# Patient Record
Sex: FEMALE | Race: WHITE | NOT HISPANIC OR LATINO | Employment: FULL TIME | URBAN - METROPOLITAN AREA
[De-identification: names, ages, dates, MRNs, and addresses within clinical notes are randomized per-mention and may not be internally consistent; named-entity substitution may affect disease eponyms.]

---

## 2019-07-15 ENCOUNTER — TELEPHONE (OUTPATIENT)
Dept: AUDIOLOGY | Facility: CLINIC | Age: 69
End: 2019-07-15

## 2019-07-15 NOTE — TELEPHONE ENCOUNTER
Returned call to patient.  She received a new headpiece that had a standard magnet in it.  She previously used a thin magnet.  She couldn't switch to her old thin magnet because it broke.  There is no skin breakdown.  I will mail out a new thin magnet today to patient's home.    Patient has not been seen since 2014.  She would like to set up annual check.  Appt will be made on 10/2/19 at 10:30 am.    The patient expressed understanding and agreement with this plan.      Johanna Gorman, CCC-A  Licensed Audiologist  [place] #[id number]

## 2019-07-15 NOTE — TELEPHONE ENCOUNTER
[facility] Call Center    Phone Message    May a detailed message be left on voicemail: yes    Reason for Call: Other: Patient has not been seen since 2014 and she stated she has a new head piece and she thinks the magnet is to tight for it she is getting irritation when she wears it. She stated she remembers Radha switching it out before to a less powerfull one. Please folow up with the patient she needs to know if she has to come in or if someone can help her over the phone. Thank you.     Action Taken: Message routed to:  [facility] audiology

## 2019-09-25 DIAGNOSIS — H90.3 SENSORINEURAL HEARING LOSS, BILATERAL: Primary | ICD-10-CM

## 2019-09-28 NOTE — PROGRESS NOTES
"AUDIOLOGY REPORT    BACKGROUND INFORMATION: Radha Treviño was seen in the Audiology Clinic on 10/2/19 for annual speech perception testing and reprogramming, ordered by Serenity Brown MD.  Dr. Jasbir Catherine implanted her with a left Advanced Bionics HiResolution 90K cochlear implant on 4/12/04 due to severe to profound sensorineural hearing loss bilaterally.  Her last Audiology visit was in 2014.        PATIENT REPORT:   1. Patient's equipment is intermittent with certain head movements.  She wonders if it is a faulty cable but doesn't have a spare.   2. Patient battles chronic left sinus issues.  This is managed by her PCP and she takes Singulair.  She wonders if she should see ENT.      METHOD: Speech perception testing is conducted at regular intervals to determine the degree of benefit the patient is obtaining from the cochlear implant. Tests are conducted using the cochlear implant without the benefit of lipreading. All tests are conducted in a sound-treated room. Perception of monosyllabic words and words in sentences are tested. Results usually show improvement over time. A decrease in performance indicates the need for re-programming of the prosthesis and/or replacement of components.     INTERVAL: 15 years     TEST RESULTS:  35 minutes were spent assessing the patient s auditory rehabilitation status.    Device(s) used for Testing:   Right ear: Not applicable  Left ear: Advanced Bionics Obdulia CI Q70 sound processor, Program 1 (Optima), 1 thin magnet without irritation  Microphone filters were changed before testing.  Normal listening check.  Patient wore clinic loaner cable during appointment and no intermittencies were noted.      Soundfield Aided Thresholds: Detection in borderline normal to moderate loss range.  Stable.  Patient will not tolerate more high frequency sound at this time as speech is \"pitchy\"    NOTE: Presentation level of 70 dB SPL was previously used but we changed to 60 dB A today (10/2/19) "  to be consistent with current testing recommendations.     CNC Words Test:  The patient repeats 25 single syllable words, auditory only. The words are presented at 70 dB SPL (louder than conversational level) delivered from a CD player.    Preoperative Performance: 4%    Left cochlear implant performance:  3 months at 70 dB SPL: 16%  6 months at 70 dB SPL: 24%  1 year at 70 dB SPL: 26%  10 years at 70 dB SPL: 24%  15 years (today) at 60 dB A: 48%    The Hearing in Noise Test (HINT):  The patient repeats 20 sentences, auditory only.   The sentences are presented in each condition at 70 dB SPL (louder than conversational level) delivered from a CD player.    Preoperative Performance: 14%    Left cochlear implant performance:  3 months at 70 dB SPL: 58%  6 months at 70 dB SPL: 44%  1 year at 70 dB SPL: 68%  10 years at 70 dB SPL: 74%  15 years (today) at 60 dB A: 85% in quiet, 43% with +10 dB signal to noise ratio (SNR)    Tympanograms: Right normal, Left shallow  Unaided hearing: No measurable hearing in either ear (profound sensorineural hearing loss bilaterally).  Only responses were vibrotactile.     FITTING SESSION: Dr. Serenity Brown ordered today's appointment. The patient came to the clinic for adjustment to the programs in the external speech processor and for assessment of the external components of the cochlear implant system. These components provide power and data to the internal device. Sound is only heard once the external portion is activated. Postoperative treatment, including device fitting and adjustment, audiologic assessments, and training are required at regular intervals. Testing can include electropsychophysical measures of threshold, comfort, and loudness balancing, which are completed to update the program.    Processor type: Obdulia CI Q70, also has Peg backup which she did not bring today  Headpiece type: Universal headpiece (UHP) for Obdulia CI Q70, HR90K for Peg  Magnet strength: UHP  thin    TEST RESULTS:   Electrode Impedances: Fairly stable with only slight fluctuations.  Recommended pulse width changed from 30.5 to 32.3.  Will monitor.    Dataloggin.6 hours of use per day  Neural Response Testing: Did not test  Facial Stimulation: Absent  Tinnitus: Absent  Balance Problems: Absent  Pain/Discomfort: Absent  Strategies Tried: Nelson, Optima S  Strategy Preference: [place] S    Programs in Obdulia CI Q70 (map #29):  1. Optima S, Clear Voice medium, T-mariely only  2. Optima S, Clear Voice medium, UltraZoom - processor microphone  3. Optima S, Clear Voice medium, Processor mariely     Number of Channels per Program: 15 (16 is off in all programs as it has been in the past)    COMMENTS: The pulse width was increased slightly (from 30.5 to 32.3) in her map based on today's impedances.  Volume was comfortable after the changes.  Programs 1-3 ([place]water S) were updated based on this change.  The old Tawanda S program was removed from slot 4 since she never uses it.      Patient is not currently interested in equipment upgrade as she would like to wait for new technology.     SUMMARY AND RECOMMENDATIONS: Radha has been using her left Advanced Bionics cochlear implant for over 15 years.  Audibility is stable compared to last testing from 5 years ago.  Speech perception scores were higher today but may have been affected by the change in presentation level (now using 60 dB A instead of 70 dB SPL).  Programming changes were made to slightly widen pulse width.  Patient will return to Audiology in 2 years for repeat speech perception testing and impedance monitoring.  She will return sooner as needed for programming.      Patient will place an order for a new cable with [employer].  Once received, she will return the loaner cable via mail.      Patient has no measurable hearing in either ear in the unaided condition.  Middle ear status is normal in the right ear.  The left tympanogram was shallow and patient  reports she continues to bowden left sinus issues.  I will have Dr. Brown review these symptoms and findings to determine if a clinic visit in ENT is advised.      The patient expressed understanding and agreement with this plan.      Johanna Gorman, CCC-A  Licensed Audiologist  [place] #[id number]        Enclosure: audiogram      Cc Serenity Brown MD

## 2019-10-02 ENCOUNTER — TELEPHONE (OUTPATIENT)
Dept: OTOLARYNGOLOGY | Facility: CLINIC | Age: 69
End: 2019-10-02

## 2019-10-02 ENCOUNTER — OFFICE VISIT (OUTPATIENT)
Dept: AUDIOLOGY | Facility: CLINIC | Age: 69
End: 2019-10-02
Payer: COMMERCIAL

## 2019-10-02 DIAGNOSIS — H90.3 SENSORY HEARING LOSS, BILATERAL: Primary | ICD-10-CM

## 2019-10-08 ENCOUNTER — TELEPHONE (OUTPATIENT)
Dept: OTOLARYNGOLOGY | Facility: CLINIC | Age: 69
End: 2019-10-08

## 2019-10-08 NOTE — TELEPHONE ENCOUNTER
Radha Prado to Dr. Brown in[employer] message       Hi Dr. Brown,   Can you take a look at this patient's tymps from today?  Dr. Catherine implanted her with left AB cochlear implant  in 2004.  She battles left sinus issues.  Managed by PCP and takes Singulair.  Left tymp was shallow today.  She says she feels fluid build in that ear at times.  Would you recommend she be seen in ENT?  And if so, by you or by sinus specialist?     Thanks,   DH

## 2019-10-08 NOTE — TELEPHONE ENCOUNTER
FUTURE VISIT INFORMATION      FUTURE VISIT INFORMATION:    Date: 10/30/2019    Time: 10:30 AM    [facility] [facility]   REFERRAL INFORMATION:    Referring provider:  Dr. Brown and Josias Pena    Referring providers clinic:  [facility]    Reason for visit/diagnosis  Sinus issues    RECORDS REQUESTED FROM:       Clinic name Comments Records Status Imaging Status   [facility] 10/2/19 Office visit with Josias Pena    [facility] 10/2/19, 10/5/19 Telephone Encounter  EPIC    [facility] 8/28/14 Audiogram with Josias Pena  7/17/14 Office visit with Josias Pena

## 2019-10-09 ENCOUNTER — DOCUMENTATION ONLY (OUTPATIENT)
Dept: CARE COORDINATION | Facility: CLINIC | Age: 69
End: 2019-10-09

## 2019-10-09 NOTE — TELEPHONE ENCOUNTER
FUTURE VISIT INFORMATION      FUTURE VISIT INFORMATION:    Date: 11/5/19    Time: 10AM    Location: [facility]  REFERRAL INFORMATION:    Referring provider:  Radha Ferrara    Referring providers clinic:  [facility]     Reason for visit/diagnosis : Left tymp was shallow. Feels fluid build in that ear at times-    RECORDS REQUESTED FROM:       Clinic name Comments Records Status Imaging Status   [facility] 10/2/19, 8/24/14 audiogram with Radha Ferrara    4/12/2004 Left Cochlear implant with Dr Catherine    [facility]    [employer]  Records dating back to 2002 for ENT and Audiology  [employer]

## 2019-10-23 NOTE — TELEPHONE ENCOUNTER
----- Message -----   From: Serenity Brown MD   Sent: 10/2/2019  12:29 PM CDT   To: Josias Almendarez, *   Subject: RE: CI patient with sinus issus and abnormal*     How about Dr. Cat for sinus and Dr. Nissen for her ear?  I don't know if they're here on the same day, but it'd be helpful.  Fernie.     Serenity   ----- Message -----   From: Radha Prado AuD   Sent: 10/2/2019  12:02 PM CDT   To: Serenity Brown MD, Josias Almendarez, *   Subject: CI patient with sinus issus and abnormal tym*     Hi Dr. Brown,   Can you take a look at this patient's tymps from today?  Dr. Catherine implanted her with left AB cochlear implant  in 2004.  She battles left sinus issues.  Managed by PCP and takes Singulair.  Left tymp was shallow today.  She says she feels fluid build in that ear at times.  Would you recommend she be seen in ENT?  And if so, by you or by sinus specialist?     Thanks,   RAH

## 2019-10-30 ENCOUNTER — PRE VISIT (OUTPATIENT)
Dept: OTOLARYNGOLOGY | Facility: CLINIC | Age: 69
End: 2019-10-30

## 2019-10-30 ENCOUNTER — OFFICE VISIT (OUTPATIENT)
Dept: OTOLARYNGOLOGY | Facility: CLINIC | Age: 69
End: 2019-10-30
Payer: COMMERCIAL

## 2019-10-30 VITALS — WEIGHT: 120 LBS | BODY MASS INDEX: 22.66 KG/M2 | HEIGHT: 61 IN

## 2019-10-30 DIAGNOSIS — J01.01 ACUTE RECURRENT MAXILLARY SINUSITIS: ICD-10-CM

## 2019-10-30 DIAGNOSIS — R09.81 NASAL CONGESTION: Primary | ICD-10-CM

## 2019-10-30 RX ORDER — CETIRIZINE HYDROCHLORIDE 10 MG/1
10 TABLET ORAL
COMMUNITY

## 2019-10-30 RX ORDER — PANTOPRAZOLE SODIUM 20 MG/1
20 TABLET, DELAYED RELEASE ORAL
COMMUNITY
Start: 2019-10-21

## 2019-10-30 RX ORDER — LOSARTAN POTASSIUM 50 MG/1
TABLET ORAL
Refills: 4 | COMMUNITY
Start: 2019-06-28

## 2019-10-30 RX ORDER — ACETAMINOPHEN 500 MG
500-1000 TABLET ORAL
COMMUNITY

## 2019-10-30 RX ORDER — BIOTIN 10 MG
TABLET ORAL
COMMUNITY

## 2019-10-30 RX ORDER — MONTELUKAST SODIUM 10 MG/1
TABLET ORAL
COMMUNITY
Start: 2018-06-29

## 2019-10-30 RX ORDER — ASCORBIC ACID 1000 MG
TABLET ORAL
COMMUNITY

## 2019-10-30 ASSESSMENT — PAIN SCALES - GENERAL: PAINLEVEL: NO PAIN (0)

## 2019-10-30 ASSESSMENT — MIFFLIN-ST. JEOR: SCORE: 1016.7

## 2019-10-30 NOTE — LETTER
10/30/2019     RE: Radha Negrete  [address] [id number]     Dear Colleague,    Thank you for referring your patient, Radha Negrete, to the [facility] E[facility] at [facility]. Please see a copy of my visit note below.             [facility]            New Patient Visit    Encounter date: October 30, 2019    Referring Provider:   Serenity Brown MD    Chief Complaint: chronic sinus congestion    History of Present Illness: Radha Negrete is a 69 year old woman with a history of left CI performed several years ago by Dr. Catherine. She was recently found to have somewhat of a flat tymp on follow up audio/CI eval, and related history of intermittent LT sided sinus congestion that waxes an wanes throughout the year. She has about 4-5 episodes per year of inc left sided facial pain/pressure, ear fullness and post-nasal discharge. Rarely does she every require antibiotics. She has been having this issue for several years. There is no prior history of sinonasal surgery. I am seeing her to evaluate for possible sinonasal inflammatory issue that may be contributing to abnormal tymp, ETD.       Review of systems: A 14-point review of systems has been conducted and was negative for any notable symptoms, except as dictated in the history of present illness.     PMH: no asthma, SNHL    PSH: CI; no sinonasal surgery    FH: non-contributory    Social History     Socioeconomic History     Marital status: Single     Spouse name: None     Number of children: None     Years of education: None     Highest education level: None   Occupational History     None   Social Needs     Financial resource strain: None     Food insecurity:     Worry: None     Inability: None     Transportation needs:     Medical: None     Non-medical: None   Tobacco Use     Smoking status: Never Smoker     Smokeless tobacco: Never Used   Substance and Sexual Activity     Alcohol use:  "None     Drug use: None     Sexual activity: None   Lifestyle     Physical activity:     Days per week: None     Minutes per session: None     Stress: None   Relationships     Social connections:     Talks on phone: None     Gets together: None     Attends Quaker service: None     Active member of club or organization: None     Attends meetings of clubs or organizations: None     Relationship status: None     Intimate partner violence:     Fear of current or ex partner: None     Emotionally abused: None     Physically abused: None     Forced sexual activity: None   Other Topics Concern     None   Social History Narrative     None        Physical Exam:  Vital signs: Ht 1.549 m (5' 1\")   Wt 54.4 kg (120 lb)   BMI 22.67 kg/m      General Appearance: No acute distress, appropriate demeanor, conversant  Eyes: moist conjunctivae; EOMI; pupils symmetric; visual acuity grossly intact; no proptosis  Head: normocephalic; overall symmetric appearance without deformity  Face: overall symmetric without deformity; HB I-VI  Ears: Normal appearance of external ear; external meatus normal in appearance; TMs intact without perforation bilaterally; there is moderate apparent thickening of the post-inf quadrant of TM without apparent SANDI  Nose: No external deformity; septum relatively midline; inferior turbinates without significant hypertrophy  Oral Cavity/oropharynx: Normal appearance of mucosa; tongue midline; no mass or lesions; oropharynx without obvious mucosal abnormality  Neck: no palpable lymphadenopathy; thyroid without palpable nodules  Lungs: symmetric chest rise; no wheezing  CV: Good distal perfusion; normal hear rate  Extremities: No deformity  Neurologic Exam: Cranial nerves II-XII are grossly intact; no focal deficit      Procedure Note  Procedure performed: Rigid nasal endoscopy  Indication: To evaluate for sinonasal pathology not visualized on routine anterior rhinoscopy  Anesthesia: 4% topical lidocaine with " "0.05% oxymetazoline  Description of procedure: A 30 degree, 3 mm rigid endoscope was inserted into bilateral nasal cavities and the nasal valves, nasal cavity, middle meatus, sphenoethmoid recess, and nasopharynx were thoroughly evaluated for evidence of obstruction, edema, purulence, polyps and/or mass/lesion.     Neelima-Dwaine Endoscopic Scoring System  Endoscopic observation Right Left   Polyps in middle meatus (0 = absent, 1 = restricted to middle meatus, 2 = Beyond middle meatus) 0 0   Discharge (0 = absent, 1 = thin and clear, 2 = thick, purulent) 0 0   Edema (0 = absent, 1 = mild-moderate, 2 = moderate-severe) 0 0   Crusting (0 = absent, 1 = mild-moderate, 2 = moderate-severe) 0 0   Scarring (0= absent, 1 = mild-moderate, 2 = moderate-severe) 0 0   Total 0 0     Findings  RT: MM and SER clear without evidence of chronic or acute inflammation  LT: MM and SER clear without evidence of chronic or acute inflammation    The patient tolerated the procedure well without complication.     Laboratory Review:  n/a    Imaging Review:  n/a    Pathology Review:  n/a    Assessment/Medical Decision Making:  History of CI with concern for sinonasal inflammation causing possible SANDI, ETD. I see no evidence of chronic or acute sinonasal inflammation on endoscopy today. Her symptoms are also quite mild, intermittent, and I think can be managed with topical therapy only at this point. Since her endoscopy is quite clear today and her symptoms are quite mild I am not recommending additional imaging at this time. She knows to call/return with worsening of symptoms or more frequent infections that don't resolve.     Plan:  1. Recommend BID flonase and prn saline irrigation when she develops \"flare\" in sinus symptoms.   2. RTC as needed    Anup Cat MD    [facility]  Rhinology  Endoscopic Skull Base Surgery  [facility]  Department of Otolaryngology - Head & Neck Surgery      "

## 2019-10-31 NOTE — PROGRESS NOTES
[facility]                   New Patient Visit      Encounter date: October 30, 2019    Referring Provider:   Serenity Brown MD    Chief Complaint: chronic sinus congestion    History of Present Illness: Radha Negrete is a 69 year old woman with a history of left CI performed several years ago by Dr. Catherine. She was recently found to have somewhat of a flat tymp on follow up audio/CI eval, and related history of intermittent LT sided sinus congestion that waxes an wanes throughout the year. She has about 4-5 episodes per year of inc left sided facial pain/pressure, ear fullness and post-nasal discharge. Rarely does she every require antibiotics. She has been having this issue for several years. There is no prior history of sinonasal surgery. I am seeing her to evaluate for possible sinonasal inflammatory issue that may be contributing to abnormal tymp, ETD.       Review of systems: A 14-point review of systems has been conducted and was negative for any notable symptoms, except as dictated in the history of present illness.     PMH: no asthma, SNHL    PSH: CI; no sinonasal surgery    FH: non-contributory    Social History     Socioeconomic History     Marital status: Single     Spouse name: None     Number of children: None     Years of education: None     Highest education level: None   Occupational History     None   Social Needs     Financial resource strain: None     Food insecurity:     Worry: None     Inability: None     Transportation needs:     Medical: None     Non-medical: None   Tobacco Use     Smoking status: Never Smoker     Smokeless tobacco: Never Used   Substance and Sexual Activity     Alcohol use: None     Drug use: None     Sexual activity: None   Lifestyle     Physical activity:     Days per week: None     Minutes per session: None     Stress: None   Relationships     Social connections:     Talks on phone: None     Gets together: None     Attends Tenriism  "service: None     Active member of club or organization: None     Attends meetings of clubs or organizations: None     Relationship status: None     Intimate partner violence:     Fear of current or ex partner: None     Emotionally abused: None     Physically abused: None     Forced sexual activity: None   Other Topics Concern     None   Social History Narrative     None        Physical Exam:  Vital signs: Ht 1.549 m (5' 1\")   Wt 54.4 kg (120 lb)   BMI 22.67 kg/m     General Appearance: No acute distress, appropriate demeanor, conversant  Eyes: moist conjunctivae; EOMI; pupils symmetric; visual acuity grossly intact; no proptosis  Head: normocephalic; overall symmetric appearance without deformity  Face: overall symmetric without deformity; HB I-VI  Ears: Normal appearance of external ear; external meatus normal in appearance; TMs intact without perforation bilaterally; there is moderate apparent thickening of the post-inf quadrant of TM without apparent SANDI  Nose: No external deformity; septum relatively midline; inferior turbinates without significant hypertrophy  Oral Cavity/oropharynx: Normal appearance of mucosa; tongue midline; no mass or lesions; oropharynx without obvious mucosal abnormality  Neck: no palpable lymphadenopathy; thyroid without palpable nodules  Lungs: symmetric chest rise; no wheezing  CV: Good distal perfusion; normal hear rate  Extremities: No deformity  Neurologic Exam: Cranial nerves II-XII are grossly intact; no focal deficit      Procedure Note  Procedure performed: Rigid nasal endoscopy  Indication: To evaluate for sinonasal pathology not visualized on routine anterior rhinoscopy  Anesthesia: 4% topical lidocaine with 0.05% oxymetazoline  Description of procedure: A 30 degree, 3 mm rigid endoscope was inserted into bilateral nasal cavities and the nasal valves, nasal cavity, middle meatus, sphenoethmoid recess, and nasopharynx were thoroughly evaluated for evidence of obstruction, " "edema, purulence, polyps and/or mass/lesion.     Joppa-Dwaine Endoscopic Scoring System  Endoscopic observation Right Left   Polyps in middle meatus (0 = absent, 1 = restricted to middle meatus, 2 = Beyond middle meatus) 0 0   Discharge (0 = absent, 1 = thin and clear, 2 = thick, purulent) 0 0   Edema (0 = absent, 1 = mild-moderate, 2 = moderate-severe) 0 0   Crusting (0 = absent, 1 = mild-moderate, 2 = moderate-severe) 0 0   Scarring (0= absent, 1 = mild-moderate, 2 = moderate-severe) 0 0   Total 0 0     Findings  RT: MM and SER clear without evidence of chronic or acute inflammation  LT: MM and SER clear without evidence of chronic or acute inflammation    The patient tolerated the procedure well without complication.     Laboratory Review:  n/a    Imaging Review:  n/a    Pathology Review:  n/a    Assessment/Medical Decision Making:  History of CI with concern for sinonasal inflammation causing possible SANDI, ETD. I see no evidence of chronic or acute sinonasal inflammation on endoscopy today. Her symptoms are also quite mild, intermittent, and I think can be managed with topical therapy only at this point. Since her endoscopy is quite clear today and her symptoms are quite mild I am not recommending additional imaging at this time. She knows to call/return with worsening of symptoms or more frequent infections that don't resolve.       Plan:  1. Recommend BID flonase and prn saline irrigation when she develops \"flare\" in sinus symptoms.   2. RTC as needed    Anup Cat MD    [facility]  Rhinology  Endoscopic Skull Base Surgery  [facility]  Department of Otolaryngology - Head & Neck Surgery      "

## 2019-11-05 ENCOUNTER — OFFICE VISIT (OUTPATIENT)
Dept: OTOLARYNGOLOGY | Facility: CLINIC | Age: 69
End: 2019-11-05
Payer: COMMERCIAL

## 2019-11-05 ENCOUNTER — HEALTH MAINTENANCE LETTER (OUTPATIENT)
Age: 69
End: 2019-11-05

## 2019-11-05 ENCOUNTER — PRE VISIT (OUTPATIENT)
Dept: OTOLARYNGOLOGY | Facility: CLINIC | Age: 69
End: 2019-11-05

## 2019-11-05 VITALS
BODY MASS INDEX: 24.19 KG/M2 | DIASTOLIC BLOOD PRESSURE: 76 MMHG | HEART RATE: 73 BPM | RESPIRATION RATE: 18 BRPM | SYSTOLIC BLOOD PRESSURE: 128 MMHG | TEMPERATURE: 97.7 F | HEIGHT: 59 IN | WEIGHT: 120 LBS

## 2019-11-05 DIAGNOSIS — H74.02 TYMPANOSCLEROSIS INVOLVING TYMPANIC MEMBRANE ONLY, LEFT: Primary | ICD-10-CM

## 2019-11-05 ASSESSMENT — PAIN SCALES - GENERAL: PAINLEVEL: NO PAIN (0)

## 2019-11-05 ASSESSMENT — MIFFLIN-ST. JEOR: SCORE: 985.82

## 2019-11-05 NOTE — PROGRESS NOTES
Dear Jenny Hebert:    I had the pleasure of meeting Radha Negrete in consultation today at the [facility] at your request.    CHIEF COMPLAINT: Ears    HISTORY OF PRESENT ILLNESS: Patient is a 69-year-old in today for assessment of her left ear.  She had a cochlear implant in 2004 and does quite well with that.  At her recent audiologic checkup on the cochlear implant, some concerns expressed with the left tympanic membrane.  She comes in for assessment of that.  She has had a history of sinus issues but treated by her local physician and doing well with that, he was just treated with decongestants and anti-allergy pills.  She denies any pain or drainage as far as the ears.  She does have bilateral tinnitus, left slightly more than right.  She denies any dizziness.  There is no dysphasia, hoarseness, facial paresthesias.    ALLERGIES:    Allergies   Allergen Reactions     Seasonal Allergies      Sulfamethoxazole-Trimethoprim Itching     Sulfa Drugs Hives and Rash       HABITS: Social History    Substance and Sexual Activity      Alcohol use: Yes        Comment: Occasional wine     History   Smoking Status     Never Smoker   Smokeless Tobacco     Former User     Comment: Quit 37 years ago         PAST MEDICAL HISTORY: Please see today's intake form (for the remainder of the PMH) which I reviewed and signed.  Past Medical History:   Diagnosis Date     Allergic rhinitis      Autoimmune disease (H)      Chronic sinusitis      Gastroesophageal reflux disease      Hearing problem      Sensorineural hearing loss      Tinnitus        FAMILY HISTORY/SOCIAL HISTORY: History reviewed. No pertinent family history.   Social History     Socioeconomic History     Marital status: Single     Spouse name: Not on file     Number of children: Not on file     Years of education: Not on file     Highest education level: Not on file   Occupational History     Not on file   Social Needs      Financial resource strain: Not on file     Food insecurity:     Worry: Not on file     Inability: Not on file     Transportation needs:     Medical: Not on file     Non-medical: Not on file   Tobacco Use     Smoking status: Never Smoker     Smokeless tobacco: Former User     Tobacco comment: Quit 37 years ago   Substance and Sexual Activity     Alcohol use: Yes     Comment: Occasional wine     Drug use: Never     Sexual activity: Never   Lifestyle     Physical activity:     Days per week: Not on file     Minutes per session: Not on file     Stress: Not on file   Relationships     Social connections:     Talks on phone: Not on file     Gets together: Not on file     Attends Congregational service: Not on file     Active member of club or organization: Not on file     Attends meetings of clubs or organizations: Not on file     Relationship status: Not on file     Intimate partner violence:     Fear of current or ex partner: Not on file     Emotionally abused: Not on file     Physically abused: Not on file     Forced sexual activity: Not on file   Other Topics Concern     Not on file   Social History Narrative     Not on file       REVIEW OF SYSTEMS: Patient Supplied Answers to Review of Systems   ENT ROS 10/29/2019   Ears, Nose, Throat Ringing/noise in ears, Nasal congestion or drainage   Gastrointestinal/Genitourinary Heartburn/indigestion   Musculoskeletal Swollen joints, Neck pain   Allergy/Immunology Allergies or hay fever       The remainder of the 10 point ROS is negative    PHYSICIAL EXAMINATION:  Constitutional: The patient was well-groomed and in no acute distress.   Skin: Warm and pink.  Psychiatric: The patient's affect was calm, cooperative, and appropriate.   Respiratory: Breathing comfortably without stridor or exertion of accessory muscles.  Eyes: Pupils were equal and reactive. Extraocular movement intact.   Head: Normocephalic and atraumatic. No lesions or scars.  Ears: Both ears examined on the  microscope.  Under high-power magnification the right side was cleaned with curettes and shows a normal tympanic membrane and middle ear.  The left ear was cleaned and examined using microscope and curette, similar techniques.  Again TM looks intact with air-filled middle ear space.  Posterior inferior she has sclerotic plaque on the TM, it looks totally benign and stable.  Nose: Sinuses were nontender. Anterior rhinoscopy revealed midline septum and absence of purulence or polyps.  Oral Cavity: Normal tongue, floor of mouth, buccal mucosa, and palate. No lesions or masses on inspection or palpation. No abnormal lymph tissue in the oropharynx.   Neck: The parotid is soft without masses. Supple with normal laryngeal and tracheal landmarks.   Lymphatic: There is no palpable lymphadenopathy or other masses in the neck.   Neurologic: Alert and oriented x 3. Cranial nerves III-XI within normal limits. Voice quality normal.  Cerebellar Function Tests:  Grossly normal        IMPRESSION AND PLAN:   1. Tympanosclerosis left TM: Stable appearance, no further treatment needed, monitor.  2. Excessive cerumen: Cleaned today, no further treatment needed, monitor.  3. Severe sensorineural hearing loss: Continue to maximize hearing with cochlear implant, no further treatment needed, monitor.    Thank you very much for the opportunity to participate in the care of your patient.    Rick L Nissen MD

## 2019-11-05 NOTE — PATIENT INSTRUCTIONS
1.  You were seen in the ENT Clinic today by Dr. Nissen.  If you have any questions or concerns after your appointment, please call [phone]. Press option #1 for scheduling related needs. Press option #3 for Nurse advice.    2.  Plan is to return to clinic as needed.      Terri Osuna LPN  [facility]  [phone]

## 2019-11-05 NOTE — NURSING NOTE
"Chief Complaint   Patient presents with     Consult     hearing loss, left ear      Blood pressure 128/76, pulse 73, temperature 97.7  F (36.5  C), resp. rate 18, height 1.5 m (4' 11.06\"), weight 54.4 kg (120 lb).    Juwan Blackman LPN    "

## 2019-11-05 NOTE — LETTER
11/5/2019       RE: Radha Negrete  [address] [address]     Dear Colleague,    Thank you for referring your patient, Radha Negrete, to the [facility] EAR NO[facility] AND THROAT at [facility]. Please see a copy of my visit note below.    Dear Jenny Hebert:    I had the pleasure of meeting Radha Negrete in consultation today at the [facility] at your request.    CHIEF COMPLAINT: Ears    HISTORY OF PRESENT ILLNESS: Patient is a 69-year-old in today for assessment of her left ear.  She had a cochlear implant in 2004 and does quite well with that.  At her recent audiologic checkup on the cochlear implant, some concerns expressed with the left tympanic membrane.  She comes in for assessment of that.  She has had a history of sinus issues but treated by her local physician and doing well with that, he was just treated with decongestants and anti-allergy pills.  She denies any pain or drainage as far as the ears.  She does have bilateral tinnitus, left slightly more than right.  She denies any dizziness.  There is no dysphasia, hoarseness, facial paresthesias.    ALLERGIES:    Allergies   Allergen Reactions     Seasonal Allergies      Sulfamethoxazole-Trimethoprim Itching     Sulfa Drugs Hives and Rash       HABITS: Social History    Substance and Sexual Activity      Alcohol use: Yes        Comment: Occasional wine     History   Smoking Status     Never Smoker   Smokeless Tobacco     Former User     Comment: Quit 37 years ago         PAST MEDICAL HISTORY: Please see today's intake form (for the remainder of the PMH) which I reviewed and signed.  Past Medical History:   Diagnosis Date     Allergic rhinitis      Autoimmune disease (H)      Chronic sinusitis      Gastroesophageal reflux disease      Hearing problem      Sensorineural hearing loss      Tinnitus        FAMILY HISTORY/SOCIAL HISTORY: History reviewed. No  pertinent family history.   Social History     Socioeconomic History     Marital status: Single     Spouse name: Not on file     Number of children: Not on file     Years of education: Not on file     Highest education level: Not on file   Occupational History     Not on file   Social Needs     Financial resource strain: Not on file     Food insecurity:     Worry: Not on file     Inability: Not on file     Transportation needs:     Medical: Not on file     Non-medical: Not on file   Tobacco Use     Smoking status: Never Smoker     Smokeless tobacco: Former User     Tobacco comment: Quit 37 years ago   Substance and Sexual Activity     Alcohol use: Yes     Comment: Occasional wine     Drug use: Never     Sexual activity: Never   Lifestyle     Physical activity:     Days per week: Not on file     Minutes per session: Not on file     Stress: Not on file   Relationships     Social connections:     Talks on phone: Not on file     Gets together: Not on file     Attends [employer] service: Not on file     Active member of club or organization: Not on file     Attends meetings of clubs or organizations: Not on file     Relationship status: Not on file     Intimate partner violence:     Fear of current or ex partner: Not on file     Emotionally abused: Not on file     Physically abused: Not on file     Forced sexual activity: Not on file   Other Topics Concern     Not on file   Social History Narrative     Not on file       REVIEW OF SYSTEMS: Patient Supplied Answers to Review of Systems   ENT ROS 10/29/2019   Ears, Nose, Throat Ringing/noise in ears, Nasal congestion or drainage   Gastrointestinal/Genitourinary Heartburn/indigestion   Musculoskeletal Swollen joints, Neck pain   Allergy/Immunology Allergies or hay fever       The remainder of the 10 point ROS is negative    PHYSICIAL EXAMINATION:  Constitutional: The patient was well-groomed and in no acute distress.   Skin: Warm and pink.  Psychiatric: The patient's affect  was calm, cooperative, and appropriate.   Respiratory: Breathing comfortably without stridor or exertion of accessory muscles.  Eyes: Pupils were equal and reactive. Extraocular movement intact.   Head: Normocephalic and atraumatic. No lesions or scars.  Ears: Both ears examined on the microscope.  Under high-power magnification the right side was cleaned with curettes and shows a normal tympanic membrane and middle ear.  The left ear was cleaned and examined using microscope and curette, similar techniques.  Again TM looks intact with air-filled middle ear space.  Posterior inferior she has sclerotic plaque on the TM, it looks totally benign and stable.  Nose: Sinuses were nontender. Anterior rhinoscopy revealed midline septum and absence of purulence or polyps.  Oral Cavity: Normal tongue, floor of mouth, buccal mucosa, and palate. No lesions or masses on inspection or palpation. No abnormal lymph tissue in the oropharynx.   Neck: The parotid is soft without masses. Supple with normal laryngeal and tracheal landmarks.   Lymphatic: There is no palpable lymphadenopathy or other masses in the neck.   Neurologic: Alert and oriented x 3. Cranial nerves III-XI within normal limits. Voice quality normal.  Cerebellar Function Tests:  Grossly normal        IMPRESSION AND PLAN:   1. Tympanosclerosis left TM: Stable appearance, no further treatment needed, monitor.  2. Excessive cerumen: Cleaned today, no further treatment needed, monitor.  3. Severe sensorineural hearing loss: Continue to maximize hearing with cochlear implant, no further treatment needed, monitor.    Thank you very much for the opportunity to participate in the care of your patient.    Rick L Nissen MD

## 2019-11-14 ENCOUNTER — TELEPHONE (OUTPATIENT)
Dept: AUDIOLOGY | Facility: CLINIC | Age: 69
End: 2019-11-14

## 2019-11-14 NOTE — TELEPHONE ENCOUNTER
Patient returned clinic loaner cable to the clinic via mail.      Johanna Gorman, CCC-A, [facility]  Licensed Audiologist  [place] #[id number]

## 2020-02-16 ENCOUNTER — HEALTH MAINTENANCE LETTER (OUTPATIENT)
Age: 70
End: 2020-02-16

## 2020-11-22 ENCOUNTER — HEALTH MAINTENANCE LETTER (OUTPATIENT)
Age: 70
End: 2020-11-22

## 2021-04-04 ENCOUNTER — HEALTH MAINTENANCE LETTER (OUTPATIENT)
Age: 71
End: 2021-04-04

## 2021-09-19 ENCOUNTER — HEALTH MAINTENANCE LETTER (OUTPATIENT)
Age: 71
End: 2021-09-19

## 2021-11-14 ENCOUNTER — HEALTH MAINTENANCE LETTER (OUTPATIENT)
Age: 71
End: 2021-11-14

## 2022-02-07 ENCOUNTER — TELEPHONE (OUTPATIENT)
Dept: AUDIOLOGY | Facility: CLINIC | Age: 72
End: 2022-02-07
Payer: COMMERCIAL

## 2022-02-07 NOTE — TELEPHONE ENCOUNTER
Letter of medical necessity for Obdulia CI M90 upgrade was routed to Dr. Brown for signature.  Once signed, it will electronically route to [employer], who will assist with insurance authorization.      Johanna Gorman, CCC-A, [facility]  Licensed Audiologist  [place] #[id number]

## 2022-03-07 DIAGNOSIS — H90.5 SNHL (SENSORINEURAL HEARING LOSS): Primary | ICD-10-CM

## 2022-05-01 ENCOUNTER — HEALTH MAINTENANCE LETTER (OUTPATIENT)
Age: 72
End: 2022-05-01

## 2022-05-02 NOTE — PROGRESS NOTES
AUDIOLOGY REPORT    BACKGROUND INFORMATION: Radha Treviño was seen in the Audiology Clinic at [facility] on 5/11/22 for cochlear implant programming and upgrade to the Obdulia CI M90 processor.  She was accompanied today by her sister, Germania.  Today's visit was odered by Serenity Brown MD.  Dr. Jasbir Catherine implanted her with a left Advanced Bionics HiResolution 90K cochlear implant on 4/12/04 due to severe to profound sensorineural hearing loss bilaterally.          PATIENT REPORT:   Patient arrives with Obdulia CI M90 upgrade kit.    FITTING SESSION: Dr. Serenity Brown ordered today's appointment. The patient came to the clinic for adjustment to the programs in the external speech processor and for assessment of the external components of the cochlear implant system. These components provide power and data to the internal device. Sound is only heard once the external portion is activated. Postoperative treatment, including device fitting and adjustment, audiologic assessments, and training are required at regular intervals. Testing can include electropsychophysical measures of threshold, comfort, and loudness balancing, which are completed to update the program.    Processor type: Obdulia CI M90 fit today; Backups: Obdulia CI Q70 and Peg   Headpiece type: Slim HP for M90, Universal headpiece (UHP) for Obdulia CI Q70, HR90K for Peg  Magnet strength: 1 in slim HP, 1 thin in UHP  *Patient is aware that she can add moleskin to the slim HP if needed.     TEST RESULTS:   Electrode Impedances: Fairly stable with slight fluctuations.  Will monitor.    Datalogging: Did not test since today is fitting   Neural Response Testing: Did not test  Facial Stimulation: Absent  Tinnitus: Absent  Balance Problems: Absent  Pain/Discomfort: Absent  Strategies Tried: HiResS, Optima S  Strategy Preference: [place] S    Programs in Obdulia CI M90:  Start-up program: AutoSense  Manual programs:   1.  Calm  2. Speech in noise  3. Off the ear    Programs in Obdulia CI Q70 (map #29): Did not change today   1. [place] S, Clear Voice medium, T-mariely only  2. Optima S, Clear Voice medium, UltraZoom - processor microphone  3. Optima S, Clear Voice medium, Processor mariely     Number of Channels per Program: 15 (16 is off in all programs as it has been in the past)    COMMENTS: The preferred Q70 program was converted from SoundWave to Target CI for use in the M90.  Upon going live in the M90 processor, no changes to most comfortable (M) levels were needed.  Patient was given manual programs in case she doesn't like AutoSense.  She was also given an off the ear program for use with the waterproof rechargeable battery pack.      All of the equipment in the M90 processor kit was reviewed. NOTE: TV Connector and waterproof rechargeable battery pack are backordered.  The processor was paired to the patient's cell phone and the AB remote chandra.  The patient was given time to practice use of the device and placement/removal of processor.  The patient's questions were answered.  Warranties were reviewed.  Patient has had her equipment a few months and postponed the fitting several times due to weather.  She will contact [employer] to see if they can change the warranty start date to today.      SUMMARY AND RECOMMENDATIONS: Radha upgraded to the Obdulia CI M90 processor today.  She will return in 1 month for follow up, magnet recheck, and speech perception testing.  If she has her waterproof rechargeable battery pack, she will bring it that day if she needs support.  The patient expressed understanding and agreement with this plan.    Johanna Gorman, CCC-A, [facility]  Licensed Audiologist  [place] #[id number]

## 2022-05-11 ENCOUNTER — OFFICE VISIT (OUTPATIENT)
Dept: AUDIOLOGY | Facility: CLINIC | Age: 72
End: 2022-05-11
Payer: COMMERCIAL

## 2022-05-11 DIAGNOSIS — H90.3 SENSORY HEARING LOSS, BILATERAL: Primary | ICD-10-CM

## 2022-05-12 ENCOUNTER — TELEPHONE (OUTPATIENT)
Dept: AUDIOLOGY | Facility: CLINIC | Age: 72
End: 2022-05-12
Payer: COMMERCIAL

## 2022-05-12 NOTE — TELEPHONE ENCOUNTER
Email received from patient. She contact [employer] to ask them to change her M90 warranty start date to the fitting date of 5/11/22.  They need confirmation from the clinic.  I sent email to [employer] customer service confirming fitting date was 5/11/22.      Johanna Gorman, CCC-A, [facility]  Licensed Audiologist  [place] #[id number]

## 2022-06-02 NOTE — PROGRESS NOTES
AUDIOLOGY REPORT    BACKGROUND INFORMATION: Radha Treviño was seen in the Audiology Clinic at [facility] on 6/23/22 for cochlear implant programming and testing.  Today's visit was odered by Serenity Brown MD.  Dr. Jasbir Catherine implanted her with a left Advanced Bionics HiResolution 90K cochlear implant on 4/12/04 due to severe to profound sensorineural hearing loss bilaterally.        Dr. Serenity Brown ordered today's visit.     PATIENT REPORT:   -Patient upgraded to the Obdulia CI M90 processor last month. She typically wears AutoSense with volume increased one step.  Therefore, she would like the starting volume increased.      -Patient reports that she has discussed some chandra glitches with Brenna Akbar at [employer].  She also noted one instance of no Bluetooth connection.  Pairing looks correct today.    -TV connector is still backordered.   -Discussed adjusting phone volume rather than CI volume when on the phone.      FITTING SESSION: Dr. Serenity Brown ordered today's appointment. The patient came to the clinic for adjustment to the programs in the external speech processor and for assessment of the external components of the cochlear implant system. These components provide power and data to the internal device. Sound is only heard once the external portion is activated. Postoperative treatment, including device fitting and adjustment, audiologic assessments, and training are required at regular intervals. Testing can include electropsychophysical measures of threshold, comfort, and loudness balancing, which are completed to update the program.    Processor type: Obdulia CI M90; Backups: Obdulia CI Q70 and Peg   Headpiece type: Slim HP for M90, Universal headpiece (UHP) for Obdulia CI Q70, HR90K for Peg  Magnet strength: 1 in slim HP, 1 thin in UHP  *No irritation or discomfort.  Patient is aware that she can add moleskin to the slim HP if needed.  She will monitor.       TEST RESULTS:   Electrode Impedances: Normal and stable     Dataloggin.6 hours of use per day  Neural Response Testing: Did not test  Facial Stimulation: Absent  Tinnitus: Absent  Balance Problems: Absent  Pain/Discomfort: Absent  Strategies Tried: HiResS, Optima S  Strategy Preference: [place] S    Programs in Obdulia CI M90:  Start-up program: AutoSense  Manual programs:   1. Calm  2. Speech in noise  3. Off the ear    Programs in Obdulia CI Q70 (map #29): Did not change today   1. [place] S, Clear Voice medium, T-mariely only  2. Optima S, Clear Voice medium, UltraZoom - processor microphone  3. Optima S, Clear Voice medium, Processor mariely     Number of Channels per Program: 15 (16 is off in all programs as it has been in the past)    COMMENTS: Patient requested that the starting volume be increased.  Most comfortable (M) levels were globally increased until comfort.  Initially patient wanted M levels increased 2 units, but then after testing, she requested another 2 unit increase.  Therefore, final M levels were 4 units higher than arrival settings.      METHOD: Speech perception testing is conducted at regular intervals to determine the degree of benefit the patient is obtaining from the cochlear implant. Tests are conducted using the cochlear implant without the benefit of lipreading. All tests are conducted in a sound-treated room. Perception of monosyllabic words and words in sentences are tested. Results usually show improvement over time. A decrease in performance indicates the need for re-programming of the prosthesis and/or replacement of components.     INTERVAL: 18 years     TEST RESULTS:  35 minutes were spent assessing the patient s auditory rehabilitation status.    Device(s) used for Testing:   Right ear: Not applicable  Left ear: [employer] Obdulia CI M90 sound processor, 1 magnet without irritation or discomfort    Soundfield Aided Thresholds: Detection in borderline normal to mild loss range.   "Stable.  Patient will not tolerate more high frequency sound at this time as speech is \"pitchy\"    NOTE: Presentation level of 70 dB SPL was previously used but we changed to 60 dB A on 10/2/19 to be consistent with current testing recommendations.     CNC Words Test:  The patient repeats 25 single syllable words, auditory only. The words are presented at 70 dB SPL (louder than conversational level) delivered from a CD player.    Preoperative Performance: 4%    Left cochlear implant performance:  3 months at 70 dB SPL: 16%  6 months at 70 dB SPL: 24%  1 year at 70 dB SPL: 26%  10 years at 70 dB SPL: 24%  15 years at 60 dB A: 48%  18 years (today) at 60 dB A: 44% - stable     The Hearing in Noise Test (HINT):  The patient repeats 20 sentences, auditory only.   The sentences are presented in each condition at 70 dB SPL (louder than conversational level) delivered from a CD player.    Preoperative Performance: 14%    Left cochlear implant performance:  3 months at 70 dB SPL: 58%  6 months at 70 dB SPL: 44%  1 year at 70 dB SPL: 68%  10 years at 70 dB SPL: 74%  15 years at 60 dB A: 85% in quiet, 43% with +10 dB signal to noise ratio (SNR)  18 years (today) at 60 dB A: 86% in quiet, 48% with +10 dB SNR - stable    Tympanograms: Right normal, Left shallow - consistent with past recordings and has been evaluated by Dr. Nissen in ENT in the past without concerns found  Unaided hearing: Did not retest since no measurable hearing in either ear (profound sensorineural hearing loss bilaterally) in the past.     SUMMARY AND RECOMMENDATIONS: Radha has been using her left Advanced Bionics cochlear implant for over 18 years and her Obdulia CI M90 for the last month.  Programming changes were made to raise the starting volume 4 units.  Magnet strength was comfortable without skin irritation so no changes were made.  Audibility is stable compared to last testing from 3 years ago.  Speech perception scores were stable.  Patient will " return to Audiology in 2 years for repeat speech perception testing.  She will return sooner as needed for programming.      Middle ear status is normal in the right ear.  The left tympanogram was shallow as in the past and she has seen Dr. Nissen for assessment of this in the past.  She will continue to follow up in ENT as needed.      The patient expressed understanding and agreement with this plan.    Johanna Gorman, CCC-A, [facility]  Licensed Audiologist  [place] #[id number]    Enclosure: audiogram

## 2022-06-23 ENCOUNTER — OFFICE VISIT (OUTPATIENT)
Dept: AUDIOLOGY | Facility: CLINIC | Age: 72
End: 2022-06-23
Payer: COMMERCIAL

## 2022-06-23 DIAGNOSIS — H90.3 SENSORY HEARING LOSS, BILATERAL: Primary | ICD-10-CM

## 2022-06-23 PROCEDURE — 92567 TYMPANOMETRY: CPT | Mod: 59 | Performed by: AUDIOLOGIST-HEARING AID FITTER

## 2022-06-23 PROCEDURE — 92626 EVAL AUD FUNCJ 1ST HOUR: CPT | Mod: 59 | Performed by: AUDIOLOGIST-HEARING AID FITTER

## 2022-06-23 PROCEDURE — 92604 REPROGRAM COCHLEAR IMPLT 7/>: CPT | Mod: LT | Performed by: AUDIOLOGIST-HEARING AID FITTER

## 2022-08-09 ENCOUNTER — TELEPHONE (OUTPATIENT)
Dept: AUDIOLOGY | Facility: CLINIC | Age: 72
End: 2022-08-09

## 2022-08-09 NOTE — TELEPHONE ENCOUNTER
Received email from patient asking if laser eye procedure is safe for cochlear implant.  Dr. Serenity Brown reviewed and indicated this was safe for the cochlear implant.  Patient was updated via email.      Johanna Gorman, CCC-A, [facility]  Licensed Audiologist  [place] #[id number]

## 2022-11-20 ENCOUNTER — HEALTH MAINTENANCE LETTER (OUTPATIENT)
Age: 72
End: 2022-11-20

## 2023-06-02 ENCOUNTER — HEALTH MAINTENANCE LETTER (OUTPATIENT)
Age: 73
End: 2023-06-02

## 2023-11-25 ENCOUNTER — HEALTH MAINTENANCE LETTER (OUTPATIENT)
Age: 73
End: 2023-11-25

## 2024-06-22 ENCOUNTER — HEALTH MAINTENANCE LETTER (OUTPATIENT)
Age: 74
End: 2024-06-22

## 2025-07-12 ENCOUNTER — HEALTH MAINTENANCE LETTER (OUTPATIENT)
Age: 75
End: 2025-07-12